# Patient Record
Sex: MALE | URBAN - METROPOLITAN AREA
[De-identification: names, ages, dates, MRNs, and addresses within clinical notes are randomized per-mention and may not be internally consistent; named-entity substitution may affect disease eponyms.]

---

## 2022-02-28 ENCOUNTER — NURSE TRIAGE (OUTPATIENT)
Dept: NURSING | Facility: CLINIC | Age: 32
End: 2022-02-28

## 2022-02-28 NOTE — CONFIDENTIAL NOTE
S-(situation): Call from patient who says he has a cut to the middle of his palm w/ paring knife when he was trying to cut through an avocado. He reports cut is about quarter of inch in length, deepest is 1/2 inch but patient doubts it went in that far. Patient has feelings in his fingers and is able to move them. It is no longer bleeding.      B-(background): no immune issues      A-(assessment): might need DTap booster      R-(recommendations): be seen today or tomorrow    Reviewed care advice with caller per RN triage protocol guideline.  Advised to call back with worsening symptoms, concerns or questions.   Caller verbalized understanding.          Amador Cardoso RN/[facility]